# Patient Record
Sex: MALE | Race: WHITE
[De-identification: names, ages, dates, MRNs, and addresses within clinical notes are randomized per-mention and may not be internally consistent; named-entity substitution may affect disease eponyms.]

---

## 2019-04-17 ENCOUNTER — HOSPITAL ENCOUNTER (OUTPATIENT)
Dept: HOSPITAL 92 - TBSIIMAG | Age: 44
Discharge: HOME | End: 2019-04-17
Attending: FAMILY MEDICINE
Payer: OTHER GOVERNMENT

## 2019-04-17 DIAGNOSIS — S83.522A: ICD-10-CM

## 2019-04-17 DIAGNOSIS — M12.562: Primary | ICD-10-CM

## 2019-04-17 DIAGNOSIS — M25.562: ICD-10-CM

## 2019-04-17 DIAGNOSIS — M22.42: ICD-10-CM

## 2019-04-17 DIAGNOSIS — R29.898: ICD-10-CM

## 2019-04-17 NOTE — MRI
MRI OF LEFT KNEE PERFORMED WITHOUT CONTRAST ENHANCEMENT:

 

History: Patient fell in January. Pain in left knee. 

 

FINDINGS: 

There is marked increased signal change within the posterior cruciate ligament, consistent with a hig
h grade tear. Given the date of the injury, this could indicate that this is a complete tear with sca
rring or granulation tissue which has formed causing a very thickened appearance to the PCL. The ante
rior cruciate ligament is intact. 

 

There is some increased intrameniscal signal change seen within the posterior horn of the medial meni
scus, but this does not extend to the articular surface and is consistent with some internal degenera
tion. The lateral meniscus has a normal shape and appearance. 

 

The medial and lateral collateral ligaments and ileotibial band regions are unremarkable. 

 

The patellar articular cartilage is intact. There is some increased signal change associated with the
 lateral facet articular cartilage which would suggest some mild chondromalacia change. There could b
e a very small fissure involving the lateral facet articular cartilage, less than 50% of the thicknes
s of the cartilage. Small medial patellar plaque is present. Medial as well as lateral patellar retin
aculum appear intact and the quadriceps and patellar tendons are normal. There is edema change within
 Hoffa's fat pad, specifically, just inferior to the patella along the lateral side, which would be c
ompatible with patellar tendon lateral femoral condyle friction syndrome which would indicate underly
ing patellar tracking abnormality. 

 

IMPRESSION: 

1. High grade tear of the posterior cruciate ligament, very thickened in appearance with amorphous si
gnal change within the substance. Given the somewhat chronic nature of the injury, some of this is pr
obably related to scarring and granulation tissue. 

2. No evidence of meniscal injury. 

3. Minimal chondromalacia changes in the lateral side of the patella. Edema change within Hoffa's fat
 pad would suggest underlying patellar tracking abnormality. 

 

POS: [facility]